# Patient Record
Sex: MALE | Race: OTHER | Employment: FULL TIME | URBAN - METROPOLITAN AREA
[De-identification: names, ages, dates, MRNs, and addresses within clinical notes are randomized per-mention and may not be internally consistent; named-entity substitution may affect disease eponyms.]

---

## 2018-01-18 ENCOUNTER — APPOINTMENT (OUTPATIENT)
Dept: GENERAL RADIOLOGY | Age: 38
End: 2018-01-18
Attending: EMERGENCY MEDICINE
Payer: OTHER GOVERNMENT

## 2018-01-18 ENCOUNTER — HOSPITAL ENCOUNTER (EMERGENCY)
Age: 38
Discharge: HOME | End: 2018-01-19
Attending: EMERGENCY MEDICINE
Payer: OTHER GOVERNMENT

## 2018-01-18 VITALS
WEIGHT: 249 LBS | HEART RATE: 62 BPM | BODY MASS INDEX: 31.95 KG/M2 | DIASTOLIC BLOOD PRESSURE: 78 MMHG | HEIGHT: 74 IN | OXYGEN SATURATION: 98 % | TEMPERATURE: 98.6 F | SYSTOLIC BLOOD PRESSURE: 127 MMHG | RESPIRATION RATE: 21 BRPM

## 2018-01-18 DIAGNOSIS — R07.89 ATYPICAL CHEST PAIN: Primary | ICD-10-CM

## 2018-01-18 PROCEDURE — 71045 X-RAY EXAM CHEST 1 VIEW: CPT

## 2018-01-18 PROCEDURE — 82550 ASSAY OF CK (CPK): CPT | Performed by: EMERGENCY MEDICINE

## 2018-01-18 PROCEDURE — 99283 EMERGENCY DEPT VISIT LOW MDM: CPT

## 2018-01-18 PROCEDURE — 85025 COMPLETE CBC W/AUTO DIFF WBC: CPT | Performed by: EMERGENCY MEDICINE

## 2018-01-18 PROCEDURE — 93005 ELECTROCARDIOGRAM TRACING: CPT

## 2018-01-18 PROCEDURE — 80053 COMPREHEN METABOLIC PANEL: CPT | Performed by: EMERGENCY MEDICINE

## 2018-01-18 RX ORDER — LISINOPRIL 20 MG/1
5 TABLET ORAL DAILY
COMMUNITY

## 2018-01-18 RX ORDER — METFORMIN HYDROCHLORIDE 500 MG/1
TABLET ORAL 2 TIMES DAILY WITH MEALS
COMMUNITY

## 2018-01-19 LAB
ALBUMIN SERPL-MCNC: 3.8 G/DL (ref 3.4–5)
ALBUMIN/GLOB SERPL: 1.2 {RATIO} (ref 0.8–1.7)
ALP SERPL-CCNC: 63 U/L (ref 45–117)
ALT SERPL-CCNC: 93 U/L (ref 16–61)
ANION GAP SERPL CALC-SCNC: 8 MMOL/L (ref 3–18)
AST SERPL-CCNC: 28 U/L (ref 15–37)
BASOPHILS # BLD: 0 K/UL (ref 0–0.1)
BASOPHILS NFR BLD: 0 % (ref 0–3)
BILIRUB SERPL-MCNC: 0.1 MG/DL (ref 0.2–1)
BUN SERPL-MCNC: 10 MG/DL (ref 7–18)
BUN/CREAT SERPL: 10 (ref 12–20)
CALCIUM SERPL-MCNC: 8.3 MG/DL (ref 8.5–10.1)
CHLORIDE SERPL-SCNC: 105 MMOL/L (ref 100–108)
CK MB CFR SERPL CALC: 1 % (ref 0–4)
CK MB SERPL-MCNC: 3.3 NG/ML (ref 5–25)
CK SERPL-CCNC: 320 U/L (ref 39–308)
CO2 SERPL-SCNC: 27 MMOL/L (ref 21–32)
CREAT SERPL-MCNC: 1 MG/DL (ref 0.6–1.3)
DIFFERENTIAL METHOD BLD: ABNORMAL
EOSINOPHIL # BLD: 0.2 K/UL (ref 0–0.4)
EOSINOPHIL NFR BLD: 2 % (ref 0–5)
ERYTHROCYTE [DISTWIDTH] IN BLOOD BY AUTOMATED COUNT: 12.5 % (ref 11.6–14.5)
GLOBULIN SER CALC-MCNC: 3.2 G/DL (ref 2–4)
GLUCOSE SERPL-MCNC: 171 MG/DL (ref 74–99)
HCT VFR BLD AUTO: 43.2 % (ref 36–48)
HGB BLD-MCNC: 15 G/DL (ref 13–16)
LYMPHOCYTES # BLD: 4.1 K/UL (ref 0.8–3.5)
LYMPHOCYTES NFR BLD: 55 % (ref 20–51)
MCH RBC QN AUTO: 30.5 PG (ref 24–34)
MCHC RBC AUTO-ENTMCNC: 34.7 G/DL (ref 31–37)
MCV RBC AUTO: 88 FL (ref 74–97)
MONOCYTES # BLD: 0.2 K/UL (ref 0–1)
MONOCYTES NFR BLD: 3 % (ref 2–9)
NEUTS SEG # BLD: 3 K/UL (ref 1.8–8)
NEUTS SEG NFR BLD: 40 % (ref 42–75)
PLATELET # BLD AUTO: 307 K/UL (ref 135–420)
PMV BLD AUTO: 8.9 FL (ref 9.2–11.8)
POTASSIUM SERPL-SCNC: 3.7 MMOL/L (ref 3.5–5.5)
PROT SERPL-MCNC: 7 G/DL (ref 6.4–8.2)
RBC # BLD AUTO: 4.91 M/UL (ref 4.7–5.5)
RBC MORPH BLD: ABNORMAL
SODIUM SERPL-SCNC: 140 MMOL/L (ref 136–145)
TROPONIN I SERPL-MCNC: <0.02 NG/ML (ref 0–0.06)
TROPONIN I SERPL-MCNC: <0.02 NG/ML (ref 0–0.06)
WBC # BLD AUTO: 7.5 K/UL (ref 4.6–13.2)
WBC MORPH BLD: ABNORMAL

## 2018-01-19 PROCEDURE — 84484 ASSAY OF TROPONIN QUANT: CPT | Performed by: EMERGENCY MEDICINE

## 2018-01-19 NOTE — ED PROVIDER NOTES
[address]  EMERGENCY DEPARTMENT HISTORY AND PHYSICAL EXAM    Date: 1/18/2018  Patient Name: Paulina Jhaveri  YOB: 1980  Medical Record Number: [id number]    History of Presenting Illness     Chief Complaint   Patient presents with    Chest Pain       History Provided By: Patient    Chief Complaint: Chest pain  Duration: 1.5 Hours  Timing:  Acute and Resolved  Location: Left sided chest  Quality: Sharp, Pressure and Tightness  Severity: 0 out of 10, at this time  Modifying Factors: No relieving or worsening factors, resolved on its own  Associated Symptoms: denies any other associated signs or symptoms    Additional History (Context):   11:11 PM  Mikey Magallanes is a 40 y.o. male with PMHX DM & cardiac arrhythmia, who presents to the emergency department C/O chest pressure, then sharp and tight left sided chest pain lasting about ~5 seconds, onset 1.5 hours ago. No associated symptoms. Reports pain began while pt was at rest. Hx of stress test 2 years ago due to irregular heart beat, that was normal. Pt denies SOB, diaphoresis, N/V, fever, chills, hx MI, fhx MI, and any other Sx or complaints. Shx: -tobacco use, -EtOH use, -illicit drug use    PCP: Nikos Gautam MD    Current Outpatient Prescriptions   Medication Sig Dispense Refill    metFORMIN (GLUCOPHAGE) 500 mg tablet Take  by mouth two (2) times daily (with meals).  lisinopril (PRINIVIL, ZESTRIL) 20 mg tablet Take 5 mg by mouth daily.  traMADol (ULTRAM) 50 mg tablet Take 1 Tab by mouth every six (6) hours as needed for Pain. Max Daily Amount: 200 mg. 20 Tab 0         Past History     Past Medical History:  Past Medical History:   Diagnosis Date    Cardiac arrhythmia     Diabetes [facility])        Past Surgical History:  History reviewed. No pertinent surgical history. Family History:  History reviewed. No pertinent family history.     Social History:  Social History   Substance Use Topics    Smoking status: Current Every Day Smoker     Packs/day: 0.25    Smokeless tobacco: Never Used    Alcohol use Yes       Allergies:  No Known Allergies      Review of Systems     Review of Systems   Constitutional: Negative for chills, diaphoresis and fever. Respiratory: Negative for shortness of breath. Cardiovascular: Positive for chest pain. Gastrointestinal: Negative for nausea and vomiting. All other systems reviewed and are negative. Physical Exam     Vitals:    01/18/18 2304 01/18/18 2325 01/18/18 2345   BP: 128/81 128/83 127/78   Pulse: 72 69 62   Resp: 16 20 21   Temp: 98.6 °F (37 °C)     SpO2: 98% 99% 98%   Weight: 112.9 kg (249 lb)     Height: 6' 2\" (1.88 m)       Physical Exam   Constitutional: He is oriented to person, place, and time. He appears well-developed and well-nourished. HENT:   Head: Normocephalic and atraumatic. Eyes: Pupils are equal, round, and reactive to light. Neck: Neck supple. Cardiovascular: Normal rate, regular rhythm, S1 normal, S2 normal and normal heart sounds. Pulmonary/Chest: Breath sounds normal. No respiratory distress. He has no wheezes. He has no rales. He exhibits no tenderness. Abdominal: Soft. He exhibits no distension and no mass. There is no tenderness. There is no guarding. Musculoskeletal: Normal range of motion. He exhibits no edema or tenderness. Neurological: He is alert and oriented to person, place, and time. No cranial nerve deficit. Skin: No rash noted. Psychiatric: He has a normal mood and affect. His behavior is normal. Thought content normal.   Nursing note and vitals reviewed.       Diagnostic Study Results     Labs -     Recent Results (from the past 12 hour(s))   EKG, 12 LEAD, INITIAL    Collection Time: 01/18/18 11:00 PM   Result Value Ref Range    Ventricular Rate 67 BPM    Atrial Rate 67 BPM    P-R Interval 170 ms    QRS Duration 104 ms    Q-T Interval 412 ms    QTC Calculation (Bezet) 435 ms    Calculated P Axis 20 degrees Calculated R Axis 18 degrees    Calculated T Axis 11 degrees    Diagnosis       Sinus rhythm with occasional and consecutive premature ventricular complexes  Abnormal ECG  No previous ECGs available     CBC WITH AUTOMATED DIFF    Collection Time: 01/18/18 11:25 PM   Result Value Ref Range    WBC 7.5 4.6 - 13.2 K/uL    RBC 4.91 4.70 - 5.50 M/uL    HGB 15.0 13.0 - 16.0 g/dL    HCT 43.2 36.0 - 48.0 %    MCV 88.0 74.0 - 97.0 FL    MCH 30.5 24.0 - 34.0 PG    MCHC 34.7 31.0 - 37.0 g/dL    RDW 12.5 11.6 - 14.5 %    PLATELET 954 850 - 356 K/uL    MPV 8.9 (L) 9.2 - 11.8 FL    NEUTROPHILS 40 (L) 42 - 75 %    LYMPHOCYTES 55 (H) 20 - 51 %    MONOCYTES 3 2 - 9 %    EOSINOPHILS 2 0 - 5 %    BASOPHILS 0 0 - 3 %    ABS. NEUTROPHILS 3.0 1.8 - 8.0 K/UL    ABS. LYMPHOCYTES 4.1 (H) 0.8 - 3.5 K/UL    ABS. MONOCYTES 0.2 0 - 1.0 K/UL    ABS. EOSINOPHILS 0.2 0.0 - 0.4 K/UL    ABS. BASOPHILS 0.0 0.0 - 0.1 K/UL    RBC COMMENTS NORMOCYTIC, NORMOCHROMIC      WBC COMMENTS REACTIVE LYMPHS      DF MANUAL     METABOLIC PANEL, COMPREHENSIVE    Collection Time: 01/18/18 11:25 PM   Result Value Ref Range    Sodium 140 136 - 145 mmol/L    Potassium 3.7 3.5 - 5.5 mmol/L    Chloride 105 100 - 108 mmol/L    CO2 27 21 - 32 mmol/L    Anion gap 8 3.0 - 18 mmol/L    Glucose 171 (H) 74 - 99 mg/dL    BUN 10 7.0 - 18 MG/DL    Creatinine 1.00 0.6 - 1.3 MG/DL    BUN/Creatinine ratio 10 (L) 12 - 20      GFR est AA >60 >60 ml/min/1.73m2    GFR est non-AA >60 >60 ml/min/1.73m2    Calcium 8.3 (L) 8.5 - 10.1 MG/DL    Bilirubin, total 0.1 (L) 0.2 - 1.0 MG/DL    ALT (SGPT) 93 (H) 16 - 61 U/L    AST (SGOT) 28 15 - 37 U/L    Alk.  phosphatase 63 45 - 117 U/L    Protein, total 7.0 6.4 - 8.2 g/dL    Albumin 3.8 3.4 - 5.0 g/dL    Globulin 3.2 2.0 - 4.0 g/dL    A-G Ratio 1.2 0.8 - 1.7     CARDIAC PANEL,(CK, CKMB & TROPONIN)    Collection Time: 01/18/18 11:25 PM   Result Value Ref Range     (H) 39 - 308 U/L    CK - MB 3.3 <3.6 ng/ml    CK-MB Index 1.0 0.0 - 4.0 % Troponin-I, Qt. <0.02 0.00 - 0.06 NG/ML   TROPONIN I    Collection Time: 01/19/18 12:28 AM   Result Value Ref Range    Troponin-I, Qt. <0.02 0.00 - 0.06 NG/ML       Radiologic Studies -   XR CHEST PORT      RADIOLOGY FINDINGS  Chest X-ray shows no acute process   Pending review by Radiologist  Recorded by Artur Jose ED Scribe, as dictated by Orion Cunningham MD        CT Results  (Last 48 hours)    None        CXR Results  (Last 48 hours)               01/18/18 2351  XR CHEST PORT Final result    Impression:  Impression:   --------------       No active pulmonary disease. Narrative:  ---------------------------------------------------------------------------   <<<<<<<<<           [facility]           >>>>>>>>>    ---------------------------------------------------------------------------       CLINICAL HISTORY:  Chest pain. COMPARISON EXAMINATIONS:  None. ---  SINGLE FRONTAL VIEW OF THE CHEST  ---       The lungs and pleural spaces are clear. The mediastinum is unremarkable in   appearance. No significant osseous abnormalities are identified.             --------------             Medications Given in the ED:  Medications - No data to display     Medical Decision Making     I am the first provider for this patient. I reviewed the vital signs, available nursing notes, past medical history, past surgical history, family history and social history. Records Reviewed: Nursing Notes    Vital Signs-Reviewed the patient's vital signs. Patient Vitals for the past 12 hrs:   Temp Pulse Resp BP SpO2   01/18/18 2345 - 62 21 127/78 98 %   01/18/18 2325 - 69 20 128/83 99 %   01/18/18 2304 98.6 °F (37 °C) 72 16 128/81 98 %       Provider Notes (Medical Decision Making):   INITIAL CLINICAL IMPRESSION and PLANS:  The patient presents with the primary complaint(s) of: chest pain.  The presentation, to include historical aspects and clinical findings are consistent with the DX of atypical chest pain. However, other possible DX's to consider as primary, associated with, or exacerbated by include:    1. Acute MI  2. ACS  3. PE  4. Dissection  5. Pericarditis  6. PNA  7. GERD  8. GI  9. Psych  10. Chest wall pain    Considering the above, my initial management plan to evaluate and therapeutic interventions include the following and as noted in the orders:    1. Labs: CBC, CMP, cardiac panel  2. Imaging: CXR  3. Medications: EKG       Pulse Oximetry Analysis - Normal 98% on RA      Cardiac Monitor:  Rate: 72 bpm  Rhythm: Sinus Rhythm with occasional, and consecutive premature ventricular complexes. EKG interpretation: (Preliminary)  11:00 PM   Rhythm: Sinus Rhythm with occasional, and consecutive premature ventricular complex. Rate (approx.): 67 bpm. QRS duration of 104 ms. EKG read by Magaly Bagley MD     ED Course:   11:11 PM Initial assessment performed. The patients presenting problems have been discussed, and they are in agreement with the care plan formulated and outlined with them. Offering no questions or concerns at this time. Diagnosis and Disposition       Discharge Note:  1:09 AM  Mikey Halldolores's  results have been reviewed with him. He has been counseled regarding his diagnosis, treatment, and plan. He verbally conveys understanding and agreement of the signs, symptoms, diagnosis, treatment and prognosis and additionally agrees to follow up as discussed. He also agrees with the care-plan and conveys that all of his questions have been answered. I have also provided discharge instructions for him that include: educational information regarding their diagnosis and treatment, and list of reasons why they would want to return to the ED prior to their follow-up appointment, should his condition change. He has been provided with education for proper emergency department utilization. Clinical Impression:    1.  Atypical chest pain        PLAN:  1. D/C Home  2. Current Discharge Medication List        3. Follow-up Information     Follow up With Details Comments Jose L Valdes, MD Schedule an appointment as soon as possible for a visit For follow up with cardiology [address] [place], [place] [address] [facility]  [address] [facility] EMERGENCY DEPT Go to As needed, if symptoms worsen [address] [facility] [address]  [phone]        _______________________________    Attestations: This note is prepared by Keisha Ordonez, acting as Scribe for Krystin Nava MD.    Krystin Nava MD:  The scribe's documentation has been prepared under my direction and personally reviewed by me in its entirety.   I confirm that the note above accurately reflects all work, treatment, procedures, and medical decision making performed by me.  _______________________________

## 2018-01-19 NOTE — ED NOTES
Patient tolerated blood draw well with no complains. Resting with family in the room and no pain at the moment.

## 2018-01-19 NOTE — ED TRIAGE NOTES
Pt reports that ~ 1 hour PTA, had ~ 10 seconds of Chest pain. Reports that he has a HX of irregular HR, unsure on the Dx.  Pain started while he was at rest.

## 2018-01-19 NOTE — DISCHARGE INSTRUCTIONS
Chest Pain: Care Instructions  Your Care Instructions    There are many things that can cause chest pain. Some are not serious and will get better on their own in a few days. But some kinds of chest pain need more testing and treatment. Your doctor may have recommended a follow-up visit in the next 8 to 12 hours. If you are not getting better, you may need more tests or treatment. Even though your doctor has released you, you still need to watch for any problems. The doctor carefully checked you, but sometimes problems can develop later. If you have new symptoms or if your symptoms do not get better, get medical care right away. If you have worse or different chest pain or pressure that lasts more than 5 minutes or you passed out (lost consciousness), call 911 or seek other emergency help right away. A medical visit is only one step in your treatment. Even if you feel better, you still need to do what your doctor recommends, such as going to all suggested follow-up appointments and taking medicines exactly as directed. This will help you recover and help prevent future problems. How can you care for yourself at home? · Rest until you feel better. · Take your medicine exactly as prescribed. Call your doctor if you think you are having a problem with your medicine. · Do not drive after taking a prescription pain medicine. When should you call for help? Call 911 if:  ? · You passed out (lost consciousness). ? · You have severe difficulty breathing. ? · You have symptoms of a heart attack. These may include:  ¨ Chest pain or pressure, or a strange feeling in your chest.  ¨ Sweating. ¨ Shortness of breath. ¨ Nausea or vomiting. ¨ Pain, pressure, or a strange feeling in your back, neck, jaw, or upper belly or in one or both shoulders or arms. ¨ Lightheadedness or sudden weakness. ¨ A fast or irregular heartbeat.   After you call 911, the  may tell you to chew 1 adult-strength or 2 to 4 low-dose aspirin. Wait for an ambulance. Do not try to drive yourself. ?Call your doctor today if:  ? · You have any trouble breathing. ? · Your chest pain gets worse. ? · You are dizzy or lightheaded, or you feel like you may faint. ? · You are not getting better as expected. ? · You are having new or different chest pain. Where can you learn more? Go to http://alvin-alejandra.info/. Enter A120 in the search box to learn more about \"Chest Pain: Care Instructions. \"  Current as of: March 20, 2017  Content Version: 11.4  © 4976-6035 [employer]. Care instructions adapted under license by [employer] (which disclaims liability or warranty for this information). If you have questions about a medical condition or this instruction, always ask your healthcare professional. [address] any warranty or liability for your use of this information.

## 2018-01-21 LAB
ATRIAL RATE: 67 BPM
CALCULATED P AXIS, ECG09: 20 DEGREES
CALCULATED R AXIS, ECG10: 18 DEGREES
CALCULATED T AXIS, ECG11: 11 DEGREES
DIAGNOSIS, 93000: NORMAL
P-R INTERVAL, ECG05: 170 MS
Q-T INTERVAL, ECG07: 412 MS
QRS DURATION, ECG06: 104 MS
QTC CALCULATION (BEZET), ECG08: 435 MS
VENTRICULAR RATE, ECG03: 67 BPM

## 2019-05-02 ENCOUNTER — HOSPITAL ENCOUNTER (EMERGENCY)
Age: 39
Discharge: HOME | End: 2019-05-02
Attending: EMERGENCY MEDICINE | Admitting: EMERGENCY MEDICINE
Payer: OTHER GOVERNMENT

## 2019-05-02 ENCOUNTER — APPOINTMENT (OUTPATIENT)
Dept: CT IMAGING | Age: 39
End: 2019-05-02
Attending: PHYSICIAN ASSISTANT
Payer: OTHER GOVERNMENT

## 2019-05-02 VITALS
RESPIRATION RATE: 18 BRPM | TEMPERATURE: 98.2 F | WEIGHT: 260 LBS | HEIGHT: 74 IN | BODY MASS INDEX: 33.37 KG/M2 | HEART RATE: 62 BPM | SYSTOLIC BLOOD PRESSURE: 129 MMHG | OXYGEN SATURATION: 98 % | DIASTOLIC BLOOD PRESSURE: 73 MMHG

## 2019-05-02 DIAGNOSIS — S06.0X0A CONCUSSION WITHOUT LOSS OF CONSCIOUSNESS, INITIAL ENCOUNTER: ICD-10-CM

## 2019-05-02 DIAGNOSIS — G93.0 INTRACRANIAL ARACHNOID CYST: ICD-10-CM

## 2019-05-02 DIAGNOSIS — S09.90XA CLOSED HEAD INJURY, INITIAL ENCOUNTER: Primary | ICD-10-CM

## 2019-05-02 PROCEDURE — 99284 EMERGENCY DEPT VISIT MOD MDM: CPT

## 2019-05-02 PROCEDURE — 74011250637 HC RX REV CODE- 250/637: Performed by: PHYSICIAN ASSISTANT

## 2019-05-02 PROCEDURE — 70450 CT HEAD/BRAIN W/O DYE: CPT

## 2019-05-02 RX ORDER — ACETAMINOPHEN 500 MG
1000 TABLET ORAL
Status: COMPLETED | OUTPATIENT
Start: 2019-05-02 | End: 2019-05-02

## 2019-05-02 RX ADMIN — ACETAMINOPHEN 1000 MG: 500 TABLET, FILM COATED ORAL at 16:23

## 2019-05-02 NOTE — ED PROVIDER NOTES
EMERGENCY DEPARTMENT HISTORY AND PHYSICAL EXAM    Date: 5/2/2019  Patient Name: Angel Escalona    History of Presenting Illness     Chief Complaint   Patient presents with    Head Injury         History Provided By: Patient    Chief Complaint: head injury    HPI(Context):   3:51 PM  Mikey Magallanes is a 45 y.o. male with PMHX of DMII who presents to the emergency department via EMS C/O head injury. Associated sxs include HA, confusion. Pt states one hour PTA he was playing kickball when he fell backwards on the field striking the back of his head on the ground. Pt states he was dazed and may have had LOC but unsure. He remembers all events. Pt states he felt \"foggy\" after injury. Since that time he reports HA and confusion but A&O to me in ED. Pt denies neck pain, back pain, nausea, vomiting, dizziness, lightheadedness, visual disturbances, and any other sxs or complaints. PCP: [facility]    Current Outpatient Medications   Medication Sig Dispense Refill    metFORMIN (GLUCOPHAGE) 500 mg tablet Take  by mouth two (2) times daily (with meals).  lisinopril (PRINIVIL, ZESTRIL) 20 mg tablet Take 5 mg by mouth daily. Past History     Past Medical History:  Past Medical History:   Diagnosis Date    Cardiac arrhythmia     Diabetes [facility])        Past Surgical History:  History reviewed. No pertinent surgical history. Family History:  History reviewed. No pertinent family history. Social History:  Social History     Tobacco Use    Smoking status: Former Smoker     Packs/day: 0.25    Smokeless tobacco: Current User   Substance Use Topics    Alcohol use: Yes    Drug use: Not on file       Allergies:  No Known Allergies      Review of Systems   Review of Systems   Constitutional: Negative for activity change. Eyes: Negative for visual disturbance. Gastrointestinal: Negative for nausea and vomiting. Musculoskeletal: Negative for back pain and neck pain. Skin: Negative for color change. Neurological: Positive for syncope (possible ) and headaches. Negative for weakness and numbness. Hematological: Does not bruise/bleed easily. Psychiatric/Behavioral: Positive for confusion. All other systems reviewed and are negative. Physical Exam     Vitals:    05/02/19 1542   BP: 129/73   Pulse: 62   Resp: 18   Temp: 98.2 °F (36.8 °C)   SpO2: 98%   Weight: 117.9 kg (260 lb)   Height: 6' 2\" (1.88 m)     Physical Exam   Constitutional: He is oriented to person, place, and time. He appears well-developed and well-nourished. No distress. Male in NAD. Alert. Answering questions. Following directions. HENT:   Head: Normocephalic and atraumatic. Head is without raccoon's eyes, without Hernandez's sign, without abrasion, without contusion, without right periorbital erythema and without left periorbital erythema. Right Ear: External ear normal. No drainage or swelling. Tympanic membrane is not perforated, not erythematous and not bulging. No hemotympanum. Left Ear: External ear normal. No drainage or swelling. Tympanic membrane is not perforated, not erythematous and not bulging. No hemotympanum. Nose: Nose normal. No mucosal edema or rhinorrhea. Mouth/Throat: Uvula is midline, oropharynx is clear and moist and mucous membranes are normal.   Eyes: Pupils are equal, round, and reactive to light. Conjunctivae and EOM are normal.   Neck: Normal range of motion and full passive range of motion without pain. No spinous process tenderness and no muscular tenderness present. No erythema and normal range of motion present. Cardiovascular: Normal rate, regular rhythm, normal heart sounds and intact distal pulses. Exam reveals no gallop and no friction rub. No murmur heard. Pulmonary/Chest: Effort normal and breath sounds normal. No accessory muscle usage. No tachypnea. No respiratory distress. He has no decreased breath sounds. He has no wheezes. He has no rhonchi. He has no rales.    Musculoskeletal: Normal range of motion. Thoracic back: He exhibits normal range of motion, no tenderness, no bony tenderness, no swelling, no deformity, no pain and no spasm. Lumbar back: He exhibits normal range of motion, no tenderness, no bony tenderness, no swelling, no deformity, no pain and no spasm. Neurological: He is alert and oriented to person, place, and time. He has normal strength. No cranial nerve deficit or sensory deficit. Coordination normal. GCS eye subscore is 4. GCS verbal subscore is 5. GCS motor subscore is 6. Normal finger to nose  Neg pronator   Skin: Skin is warm and dry. He is not diaphoretic. Psychiatric: He has a normal mood and affect. Judgment normal.   Nursing note and vitals reviewed. Diagnostic Study Results     Labs -   No results found for this or any previous visit (from the past 12 hour(s)). CT HEAD WO CONT   Final Result   IMPRESSION:         1. No acute intracranial abnormality. 2. Rounded CSF density adjacent to the medial right temporal lobe, potentially   an arachnoid cyst versus dilated perivascular space. CT Results  (Last 48 hours)               05/02/19 1656  CT HEAD WO CONT Final result    Impression:  IMPRESSION:           1. No acute intracranial abnormality. 2. Rounded CSF density adjacent to the medial right temporal lobe, potentially   an arachnoid cyst versus dilated perivascular space. Narrative:  EXAM: CT head       INDICATION: Fall, striking back of head with loss of consciousness       COMPARISON: None. TECHNIQUE: Axial CT imaging of the head was performed without intravenous   contrast.       One or more dose reduction techniques were used on this CT: automated exposure   control, adjustment of the mAs and/or kVp according to patient size, and   iterative reconstruction techniques. The specific techniques used on this CT   exam have been documented in the patient's electronic medical record.   Digital   Imaging and Communications in Medicine (DICOM) format image data are available   to nonaffiliated external healthcare facilities or entities on a secure, media   free, reciprocally searchable basis with patient authorization for at least a   12-month period after this study. _______________       FINDINGS:       BRAIN AND POSTERIOR FOSSA: The sulci, folia, ventricles and basal cisterns are   within normal limits for the patient?s age. There is no intracranial hemorrhage,   mass effect, or midline shift. There are no areas of abnormal parenchymal   attenuation. Rounded area of CSF density inferior to the right basal ganglion   noted within the mesial right temporal lobe. EXTRA-AXIAL SPACES AND MENINGES: There are no abnormal extra-axial fluid   collections. CALVARIUM: Intact. SINUSES: Clear. OTHER: None.       _______________               CXR Results  (Last 48 hours)    None          Medications given in the ED-  Medications   acetaminophen (TYLENOL) tablet 1,000 mg (1,000 mg Oral Given 5/2/19 1623)         Medical Decision Making   I am the first provider for this patient. I reviewed the vital signs, available nursing notes, past medical history, past surgical history, family history and social history. Vital Signs-Reviewed the patient's vital signs. Pulse Oximetry Analysis - 98% on RA     Records Reviewed: Nursing Notes    Provider Notes (Medical Decision Making): concussion, head contusion, skull fx, intracranial fx, hematoma    Procedures:  Procedures    ED Course:   3:51 PM Initial assessment performed. The patients presenting problems have been discussed, and they are in agreement with the care plan formulated and outlined with them. I have encouraged them to ask questions as they arise throughout their visit. Diagnosis and Disposition       Imaging without acute process. Small arachnoid cyst. No midline spinal tenderness or neck/back complaints. No FND.  Head precautions discussed. Reasons to RTED discussed with pt. All questions answered. Pt feels comfortable going home at this time. Pt expressed understanding and he agrees with plan. 1. Closed head injury, initial encounter    2. Concussion without loss of consciousness, initial encounter    3. Intracranial arachnoid cyst        PLAN:  1. D/C Home  2. Discharge Medication List as of 5/2/2019  5:20 PM        3. Follow-up Information     Follow up With Specialties Details [address]  [address] [place] [address]  [phone]    [facility] EMERGENCY DEPT Emergency Medicine  As needed, If symptoms worsen [address] [address]  [phone]        _______________________________    Attestations: This note is prepared by Ramon Aguiar PA-C.  _______________________________          Please note that this dictation was completed with [employer], the computer voice recognition software. Quite often unanticipated grammatical, syntax, homophones, and other interpretive errors are inadvertently transcribed by the computer software. Please disregard these errors. Please excuse any errors that have escaped final proofreading.

## 2019-05-02 NOTE — ED TRIAGE NOTES
Patient arrived per EMS to be followed [place]. Pt was playing kickball and tripped, falling backwards and hitting the back of his head. Patient reports losing consciousness, feeling foggy and in a \"dream-like state\". Pt reports some neck tenderness but no back pain.